# Patient Record
Sex: MALE | Race: WHITE | NOT HISPANIC OR LATINO
[De-identification: names, ages, dates, MRNs, and addresses within clinical notes are randomized per-mention and may not be internally consistent; named-entity substitution may affect disease eponyms.]

---

## 2017-03-14 ENCOUNTER — APPOINTMENT (OUTPATIENT)
Dept: CARDIOLOGY | Facility: CLINIC | Age: 52
End: 2017-03-14

## 2017-03-28 ENCOUNTER — APPOINTMENT (OUTPATIENT)
Dept: CARDIOLOGY | Facility: CLINIC | Age: 52
End: 2017-03-28

## 2017-08-08 ENCOUNTER — MEDICATION RENEWAL (OUTPATIENT)
Age: 52
End: 2017-08-08

## 2018-02-17 ENCOUNTER — RX RENEWAL (OUTPATIENT)
Age: 53
End: 2018-02-17

## 2019-05-15 ENCOUNTER — NON-APPOINTMENT (OUTPATIENT)
Age: 54
End: 2019-05-15

## 2019-05-15 ENCOUNTER — APPOINTMENT (OUTPATIENT)
Dept: CARDIOLOGY | Facility: CLINIC | Age: 54
End: 2019-05-15
Payer: COMMERCIAL

## 2019-05-15 VITALS — HEART RATE: 68 BPM | DIASTOLIC BLOOD PRESSURE: 102 MMHG | OXYGEN SATURATION: 100 % | SYSTOLIC BLOOD PRESSURE: 158 MMHG

## 2019-05-15 VITALS — BODY MASS INDEX: 29.83 KG/M2 | WEIGHT: 202 LBS

## 2019-05-15 DIAGNOSIS — E78.5 HYPERLIPIDEMIA, UNSPECIFIED: ICD-10-CM

## 2019-05-15 DIAGNOSIS — Z82.49 FAMILY HISTORY OF ISCHEMIC HEART DISEASE AND OTHER DISEASES OF THE CIRCULATORY SYSTEM: ICD-10-CM

## 2019-05-15 DIAGNOSIS — I25.10 ATHEROSCLEROTIC HEART DISEASE OF NATIVE CORONARY ARTERY W/OUT ANGINA PECTORIS: ICD-10-CM

## 2019-05-15 DIAGNOSIS — K21.9 GASTRO-ESOPHAGEAL REFLUX DISEASE W/OUT ESOPHAGITIS: ICD-10-CM

## 2019-05-15 DIAGNOSIS — Z60.2 PROBLEMS RELATED TO LIVING ALONE: ICD-10-CM

## 2019-05-15 DIAGNOSIS — Z78.9 OTHER SPECIFIED HEALTH STATUS: ICD-10-CM

## 2019-05-15 PROCEDURE — 93000 ELECTROCARDIOGRAM COMPLETE: CPT

## 2019-05-15 PROCEDURE — 99214 OFFICE O/P EST MOD 30 MIN: CPT

## 2019-05-15 RX ORDER — LISINOPRIL 20 MG/1
20 TABLET ORAL
Refills: 0 | Status: ACTIVE | COMMUNITY
Start: 2019-05-15

## 2019-05-15 SDOH — SOCIAL STABILITY - SOCIAL INSECURITY: PROBLEMS RELATED TO LIVING ALONE: Z60.2

## 2019-05-15 NOTE — HISTORY OF PRESENT ILLNESS
[FreeTextEntry1] : Patient is a 53-year-old  male with established coronary artery disease, status post CABG x4 on September 25, 2015 by Dr. Wild Robles, hyperlipidemia with intolerance to statins, patient is on Praluent. Patient is here for cardiology consultation since he has not seen a cardiologist in over 3 years.\par \par Patient denies any exertional complaints. All other systems reviewed negative.

## 2019-05-15 NOTE — ASSESSMENT
[FreeTextEntry1] : EKG- Sinus Bradycardia. NST\par \par Assessment:\par 1. CAD- S/P CABGX4\par 2. HTN\par 3. Hyperlipidemia- Intolerance to statins\par \par Recommendations:\par 1. Continue current medical therapy\par 2. Patient states he is scheduled to have echo with his PCP\par 3. Regular Stress Test\par 4. F/U 6 months

## 2019-05-15 NOTE — PHYSICAL EXAM
[General Appearance - Well Developed] : well developed [Normal Appearance] : normal appearance [Well Groomed] : well groomed [General Appearance - Well Nourished] : well nourished [No Deformities] : no deformities [General Appearance - In No Acute Distress] : no acute distress [Normal Conjunctiva] : the conjunctiva exhibited no abnormalities [Normal Oral Mucosa] : normal oral mucosa [Normal Oropharynx] : normal oropharynx [Heart Rate And Rhythm] : heart rate and rhythm were normal [FreeTextEntry1] : No JVD [Auscultation Breath Sounds / Voice Sounds] : lungs were clear to auscultation bilaterally [Bowel Sounds] : normal bowel sounds [Heart Sounds] : normal S1 and S2 [Abdomen Soft] : soft [Cyanosis, Localized] : no localized cyanosis [Abnormal Walk] : normal gait

## 2019-06-07 ENCOUNTER — APPOINTMENT (OUTPATIENT)
Dept: CARDIOLOGY | Facility: CLINIC | Age: 54
End: 2019-06-07

## 2019-09-16 ENCOUNTER — MEDICATION RENEWAL (OUTPATIENT)
Age: 54
End: 2019-09-16

## 2019-09-16 RX ORDER — ALIROCUMAB 150 MG/ML
150 INJECTION, SOLUTION SUBCUTANEOUS
Qty: 2 | Refills: 6 | Status: ACTIVE | COMMUNITY
Start: 2019-05-15 | End: 1900-01-01

## 2019-09-17 ENCOUNTER — MEDICATION RENEWAL (OUTPATIENT)
Age: 54
End: 2019-09-17

## 2020-05-20 ENCOUNTER — RX RENEWAL (OUTPATIENT)
Age: 55
End: 2020-05-20

## 2020-05-20 RX ORDER — ALIROCUMAB 150 MG/ML
150 INJECTION, SOLUTION SUBCUTANEOUS
Qty: 2 | Refills: 0 | Status: ACTIVE | COMMUNITY
Start: 2020-05-20 | End: 1900-01-01

## 2020-12-31 ENCOUNTER — EMERGENCY (EMERGENCY)
Facility: HOSPITAL | Age: 55
LOS: 1 days | Discharge: HOME | End: 2020-12-31
Attending: EMERGENCY MEDICINE
Payer: COMMERCIAL

## 2020-12-31 VITALS
HEIGHT: 69 IN | TEMPERATURE: 98 F | HEART RATE: 124 BPM | DIASTOLIC BLOOD PRESSURE: 124 MMHG | RESPIRATION RATE: 18 BRPM | OXYGEN SATURATION: 99 % | WEIGHT: 207.01 LBS | SYSTOLIC BLOOD PRESSURE: 208 MMHG

## 2020-12-31 VITALS
RESPIRATION RATE: 16 BRPM | HEART RATE: 95 BPM | DIASTOLIC BLOOD PRESSURE: 94 MMHG | TEMPERATURE: 98 F | SYSTOLIC BLOOD PRESSURE: 123 MMHG | OXYGEN SATURATION: 99 %

## 2020-12-31 LAB
ALBUMIN SERPL ELPH-MCNC: 4.4 G/DL — SIGNIFICANT CHANGE UP (ref 3.3–5.2)
ALP SERPL-CCNC: 51 U/L — SIGNIFICANT CHANGE UP (ref 40–120)
ALT FLD-CCNC: 17 U/L — SIGNIFICANT CHANGE UP
ANION GAP SERPL CALC-SCNC: 12 MMOL/L — SIGNIFICANT CHANGE UP (ref 5–17)
APPEARANCE UR: CLEAR — SIGNIFICANT CHANGE UP
APTT BLD: 30.7 SEC — SIGNIFICANT CHANGE UP (ref 27.5–35.5)
AST SERPL-CCNC: 31 U/L — SIGNIFICANT CHANGE UP
BASOPHILS # BLD AUTO: 0.06 K/UL — SIGNIFICANT CHANGE UP (ref 0–0.2)
BASOPHILS NFR BLD AUTO: 0.7 % — SIGNIFICANT CHANGE UP (ref 0–2)
BILIRUB SERPL-MCNC: 0.5 MG/DL — SIGNIFICANT CHANGE UP (ref 0.4–2)
BILIRUB UR-MCNC: NEGATIVE — SIGNIFICANT CHANGE UP
BUN SERPL-MCNC: 18 MG/DL — SIGNIFICANT CHANGE UP (ref 8–20)
CALCIUM SERPL-MCNC: 9.4 MG/DL — SIGNIFICANT CHANGE UP (ref 8.6–10.2)
CHLORIDE SERPL-SCNC: 104 MMOL/L — SIGNIFICANT CHANGE UP (ref 98–107)
CO2 SERPL-SCNC: 21 MMOL/L — LOW (ref 22–29)
COLOR SPEC: YELLOW — SIGNIFICANT CHANGE UP
CREAT SERPL-MCNC: 0.96 MG/DL — SIGNIFICANT CHANGE UP (ref 0.5–1.3)
DIFF PNL FLD: NEGATIVE — SIGNIFICANT CHANGE UP
EOSINOPHIL # BLD AUTO: 0.05 K/UL — SIGNIFICANT CHANGE UP (ref 0–0.5)
EOSINOPHIL NFR BLD AUTO: 0.6 % — SIGNIFICANT CHANGE UP (ref 0–6)
ETHANOL SERPL-MCNC: <10 MG/DL — HIGH (ref 0–9)
GLUCOSE SERPL-MCNC: 150 MG/DL — HIGH (ref 70–99)
GLUCOSE UR QL: NEGATIVE MG/DL — SIGNIFICANT CHANGE UP
HCT VFR BLD CALC: 36.8 % — LOW (ref 39–50)
HGB BLD-MCNC: 10.7 G/DL — LOW (ref 13–17)
IMM GRANULOCYTES NFR BLD AUTO: 0.5 % — SIGNIFICANT CHANGE UP (ref 0–1.5)
INR BLD: 1.14 RATIO — SIGNIFICANT CHANGE UP (ref 0.88–1.16)
KETONES UR-MCNC: NEGATIVE — SIGNIFICANT CHANGE UP
LEUKOCYTE ESTERASE UR-ACNC: NEGATIVE — SIGNIFICANT CHANGE UP
LIDOCAIN IGE QN: 32 U/L — SIGNIFICANT CHANGE UP (ref 22–51)
LYMPHOCYTES # BLD AUTO: 1.06 K/UL — SIGNIFICANT CHANGE UP (ref 1–3.3)
LYMPHOCYTES # BLD AUTO: 12.8 % — LOW (ref 13–44)
MAGNESIUM SERPL-MCNC: 2 MG/DL — SIGNIFICANT CHANGE UP (ref 1.6–2.6)
MCHC RBC-ENTMCNC: 22.5 PG — LOW (ref 27–34)
MCHC RBC-ENTMCNC: 29.1 GM/DL — LOW (ref 32–36)
MCV RBC AUTO: 77.3 FL — LOW (ref 80–100)
MONOCYTES # BLD AUTO: 0.44 K/UL — SIGNIFICANT CHANGE UP (ref 0–0.9)
MONOCYTES NFR BLD AUTO: 5.3 % — SIGNIFICANT CHANGE UP (ref 2–14)
NEUTROPHILS # BLD AUTO: 6.63 K/UL — SIGNIFICANT CHANGE UP (ref 1.8–7.4)
NEUTROPHILS NFR BLD AUTO: 80.1 % — HIGH (ref 43–77)
NITRITE UR-MCNC: NEGATIVE — SIGNIFICANT CHANGE UP
PH UR: 7 — SIGNIFICANT CHANGE UP (ref 5–8)
PLATELET # BLD AUTO: 285 K/UL — SIGNIFICANT CHANGE UP (ref 150–400)
POTASSIUM SERPL-MCNC: 4.1 MMOL/L — SIGNIFICANT CHANGE UP (ref 3.5–5.3)
POTASSIUM SERPL-SCNC: 4.1 MMOL/L — SIGNIFICANT CHANGE UP (ref 3.5–5.3)
PROT SERPL-MCNC: 7.6 G/DL — SIGNIFICANT CHANGE UP (ref 6.6–8.7)
PROT UR-MCNC: NEGATIVE MG/DL — SIGNIFICANT CHANGE UP
PROTHROM AB SERPL-ACNC: 13.1 SEC — SIGNIFICANT CHANGE UP (ref 10.6–13.6)
RBC # BLD: 4.76 M/UL — SIGNIFICANT CHANGE UP (ref 4.2–5.8)
RBC # FLD: 15.5 % — HIGH (ref 10.3–14.5)
SODIUM SERPL-SCNC: 137 MMOL/L — SIGNIFICANT CHANGE UP (ref 135–145)
SP GR SPEC: 1 — LOW (ref 1.01–1.02)
TROPONIN T SERPL-MCNC: <0.01 NG/ML — SIGNIFICANT CHANGE UP (ref 0–0.06)
TROPONIN T SERPL-MCNC: <0.01 NG/ML — SIGNIFICANT CHANGE UP (ref 0–0.06)
UROBILINOGEN FLD QL: NEGATIVE MG/DL — SIGNIFICANT CHANGE UP
WBC # BLD: 8.28 K/UL — SIGNIFICANT CHANGE UP (ref 3.8–10.5)
WBC # FLD AUTO: 8.28 K/UL — SIGNIFICANT CHANGE UP (ref 3.8–10.5)

## 2020-12-31 PROCEDURE — 81003 URINALYSIS AUTO W/O SCOPE: CPT

## 2020-12-31 PROCEDURE — 93005 ELECTROCARDIOGRAM TRACING: CPT

## 2020-12-31 PROCEDURE — 96375 TX/PRO/DX INJ NEW DRUG ADDON: CPT | Mod: XU

## 2020-12-31 PROCEDURE — 83735 ASSAY OF MAGNESIUM: CPT

## 2020-12-31 PROCEDURE — 96361 HYDRATE IV INFUSION ADD-ON: CPT

## 2020-12-31 PROCEDURE — 93010 ELECTROCARDIOGRAM REPORT: CPT

## 2020-12-31 PROCEDURE — 80307 DRUG TEST PRSMV CHEM ANLYZR: CPT

## 2020-12-31 PROCEDURE — 83690 ASSAY OF LIPASE: CPT

## 2020-12-31 PROCEDURE — 71046 X-RAY EXAM CHEST 2 VIEWS: CPT | Mod: 26

## 2020-12-31 PROCEDURE — 93307 TTE W/O DOPPLER COMPLETE: CPT

## 2020-12-31 PROCEDURE — 85025 COMPLETE CBC W/AUTO DIFF WBC: CPT

## 2020-12-31 PROCEDURE — 99284 EMERGENCY DEPT VISIT MOD MDM: CPT | Mod: 25

## 2020-12-31 PROCEDURE — 80053 COMPREHEN METABOLIC PANEL: CPT

## 2020-12-31 PROCEDURE — 36415 COLL VENOUS BLD VENIPUNCTURE: CPT

## 2020-12-31 PROCEDURE — 99218: CPT

## 2020-12-31 PROCEDURE — G0378: CPT

## 2020-12-31 PROCEDURE — 96374 THER/PROPH/DIAG INJ IV PUSH: CPT

## 2020-12-31 PROCEDURE — 85730 THROMBOPLASTIN TIME PARTIAL: CPT

## 2020-12-31 PROCEDURE — 85610 PROTHROMBIN TIME: CPT

## 2020-12-31 PROCEDURE — 93306 TTE W/DOPPLER COMPLETE: CPT | Mod: 26

## 2020-12-31 PROCEDURE — 84484 ASSAY OF TROPONIN QUANT: CPT

## 2020-12-31 PROCEDURE — 71046 X-RAY EXAM CHEST 2 VIEWS: CPT

## 2020-12-31 RX ORDER — VALSARTAN 80 MG/1
160 TABLET ORAL DAILY
Refills: 0 | Status: DISCONTINUED | OUTPATIENT
Start: 2020-12-31 | End: 2021-01-04

## 2020-12-31 RX ORDER — SODIUM CHLORIDE 9 MG/ML
1000 INJECTION INTRAMUSCULAR; INTRAVENOUS; SUBCUTANEOUS ONCE
Refills: 0 | Status: COMPLETED | OUTPATIENT
Start: 2020-12-31 | End: 2020-12-31

## 2020-12-31 RX ORDER — PANTOPRAZOLE SODIUM 20 MG/1
40 TABLET, DELAYED RELEASE ORAL ONCE
Refills: 0 | Status: COMPLETED | OUTPATIENT
Start: 2020-12-31 | End: 2020-12-31

## 2020-12-31 RX ORDER — DIAZEPAM 5 MG
10 TABLET ORAL ONCE
Refills: 0 | Status: DISCONTINUED | OUTPATIENT
Start: 2020-12-31 | End: 2020-12-31

## 2020-12-31 RX ORDER — ATORVASTATIN CALCIUM 80 MG/1
20 TABLET, FILM COATED ORAL AT BEDTIME
Refills: 0 | Status: DISCONTINUED | OUTPATIENT
Start: 2020-12-31 | End: 2021-01-04

## 2020-12-31 RX ORDER — METOPROLOL TARTRATE 50 MG
50 TABLET ORAL DAILY
Refills: 0 | Status: DISCONTINUED | OUTPATIENT
Start: 2020-12-31 | End: 2021-01-04

## 2020-12-31 RX ORDER — ASPIRIN/CALCIUM CARB/MAGNESIUM 324 MG
162 TABLET ORAL ONCE
Refills: 0 | Status: COMPLETED | OUTPATIENT
Start: 2020-12-31 | End: 2020-12-31

## 2020-12-31 RX ADMIN — Medication 50 MILLIGRAM(S): at 16:47

## 2020-12-31 RX ADMIN — PANTOPRAZOLE SODIUM 40 MILLIGRAM(S): 20 TABLET, DELAYED RELEASE ORAL at 12:34

## 2020-12-31 RX ADMIN — SODIUM CHLORIDE 1000 MILLILITER(S): 9 INJECTION INTRAMUSCULAR; INTRAVENOUS; SUBCUTANEOUS at 15:42

## 2020-12-31 RX ADMIN — Medication 162 MILLIGRAM(S): at 12:34

## 2020-12-31 RX ADMIN — Medication 10 MILLIGRAM(S): at 12:34

## 2020-12-31 RX ADMIN — SODIUM CHLORIDE 1000 MILLILITER(S): 9 INJECTION INTRAMUSCULAR; INTRAVENOUS; SUBCUTANEOUS at 12:34

## 2020-12-31 NOTE — ED ADULT NURSE NOTE - CHPI ED NUR SYMPTOMS NEG
no blood in stool/no burning urination/no chills/no diarrhea/no dysuria/no fever/no hematuria/no nausea/no vomiting

## 2020-12-31 NOTE — ED PROVIDER NOTE - NS ED ROS FT
ROS: +CP no SOB. no cough. no fever. no n/v/d/c. +abd pain. no rash. no bleeding. no urinary complaints. no weakness. no vision changes. no HA. no neck/back pain. no extremity swelling/deformity. No change in mental status.

## 2020-12-31 NOTE — ED ADULT NURSE NOTE - OBJECTIVE STATEMENT
Pt with PMHx of CABG (4 years ago) presents with c/o upper abd pain with bloating. Pt states it worsens with eating, currently improved. Pt states last night the pain was intense and radiated bilaterally in his chest. Pt admits to drinking beer often and last drink was Sunday. Pt states he feels "a little shaky". No other signs of withdrawals. Pt saw PMD and had abnormal EKG, pt sent to ER for eval. Pt denies any N/V/D, fevers, chills, cough, sob.

## 2020-12-31 NOTE — ED CDU PROVIDER DISPOSITION NOTE - ATTENDING CONTRIBUTION TO CARE
I, Nirmal Luna, have personally performed a face to face diagnostic evaluation on this patient. I have reviewed the ACP note and agree with the history, exam and plan of care, except as noted.    56 yo M hx HTN, hyperlipidemia and CAD s/p 4 vessel CABG in 2015 p/w chest pain and abdominal pain x 4 days. ekg without ischemic changes. trop negative x 2. TTE unremarkable. patient seen by cardiology, cleared for discharge with follow up stress test outpatient.

## 2020-12-31 NOTE — ED ADULT TRIAGE NOTE - CHIEF COMPLAINT QUOTE
Patient states that he was having epigastric pain last night that became to move into his chest this morning. Patient states that he went to his PCP and had EKG changes. PMHx of bypass surgery. Patient denies any SOB.

## 2020-12-31 NOTE — ED ADULT NURSE REASSESSMENT NOTE - NS ED NURSE REASSESS COMMENT FT1
Assumed care of the patient at 1600. Verbal report received from [facility]. Patient currently in TTE testing. Patient to be transferred to CDU 9 after Echo testing.
Patient offered librium po, refuses at this time.
Patient transferred back from TTE testing to CDU 9. Patient A&Ox4. Asymptomatic. Denies CP/SOB/palpitations or dizziness at this time. VSS. NSR on CM. PIV patent. Ambulatory with steady gait. Rpt trop sent. Pending results from blood work and TTE read. CIWA 1, will give librium po 25mg as per orders. Patient admits to drink 5 beers everyday. Patient in understanding of plan of care. Patient with no further questions for the RN. Resting in comfort. Call bell within reach and encouraged to use when assistance needed. Will continue to monitor.

## 2020-12-31 NOTE — ED CDU PROVIDER DISPOSITION NOTE - PATIENT PORTAL LINK FT
You can access the FollowMyHealth Patient Portal offered by [facility] by registering at the following website: [email or web address]. By joining [facility]’s FollowMyHealth portal, you will also be able to view your health information using other applications (apps) compatible with our system.

## 2020-12-31 NOTE — ED PROVIDER NOTE - CLINICAL SUMMARY MEDICAL DECISION MAKING FREE TEXT BOX
patient with upper abd pain radiating into chest, mild component of alcohol withdrawal, concern for mild pancreatitis vs PUD/gastritis vs ACS. with ekg changes will require further cardiac w/u

## 2020-12-31 NOTE — CONSULT NOTE ADULT - SUBJECTIVE AND OBJECTIVE BOX
Patient is a 55y old  Male who presents with a chief complaint of abdominal pain and lower chest pain    HPI:  55 year old male with a history of HTN, hyperlipidemia and CAD s/p 4 vessel CABG in  who had abdominal pain .  It improved.  Last night he had recurrence and felt it rise up into the chest and especially in the epigastric area.  There was no associated nausea, vomiting, diaphoresis or shortness of breath.  This is completely different than his anginal pain at the time of his CABG which included arm pain and dyspnea on minimal exertion.  He has not taken his Metoprolol for a couple of days due to abdominal discomfort.  He went to see his PMD who recommended he come to the ER to possibly check for pancreatitis as he does have a history of drinking and has been trying to cut down.  Currently patient is feeling well and is back to baseline and feels well with no chest or abdominal discomfort.    PAST MEDICAL & SURGICAL HISTORY:  HTN, Hyperlipidemia, CAD s/p 4 vessel CABG      Allergies    No Known Allergies    Intolerances: Statins.  Patient maintained on Praluent        MEDICATIONS  (STANDING):  Praluent  metoprolol tartrate 50 milliGRAM(s) Oral BID  valsartan 160 milliGRAM(s) Oral daily  ASA 81  Protonix    FAMILY HISTORY:      SOCIAL HISTORY:    CIGARETTES:  Former. Does still smoke Marijuana    ALCOHOL:  6-7 beers a day    REVIEW OF SYSTEMS:  CONSTITUTIONAL: No fever, weight loss, or fatigue  EYES: No eye pain, visual disturbances, or discharge  ENMT:  No difficulty hearing, tinnitus, vertigo; No sinus or throat pain  NECK: No pain or stiffness  RESPIRATORY: No cough, wheezing, chills or hemoptysis; No Shortness of Breath  CARDIOVASCULAR: No  palpitations, passing out, dizziness, or leg swelling  GASTROINTESTINAL: No abdominal or epigastric pain. No nausea, vomiting, or hematemesis; No diarrhea or constipation. No melena or hematochezia.  GENITOURINARY: No dysuria, frequency, hematuria, or incontinence  NEUROLOGICAL: No headaches, memory loss, loss of strength, numbness, or tremors  SKIN: No itching, burning, rashes, or lesions   LYMPH Nodes: No enlarged glands  ENDOCRINE: No heat or cold intolerance; No hair loss  MUSCULOSKELETAL: No joint pain or swelling; No muscle, back, or extremity pain  PSYCHIATRIC: No depression, anxiety, mood swings, or difficulty sleeping  HEME/LYMPH: No easy bruising, or bleeding gums  ALLERY AND IMMUNOLOGIC: No hives or eczema	    Vital Signs Last 24 Hrs  T(C): 36.7 (31 Dec 2020 16:28), Max: 36.7 (31 Dec 2020 11:22)  T(F): 98.1 (31 Dec 2020 16:28), Max: 98.1 (31 Dec 2020 16:28)  HR: 95 (31 Dec 2020 16:28) (93 - 124)  BP: 123/94 (31 Dec 2020 16:28) (123/94 - 208/124)  BP(mean): --  RR: 16 (31 Dec 2020 16:28) (16 - 18)  SpO2: 99% (31 Dec 2020 16:28) (99% - 100%)    Daily Height in cm: 175.26 (31 Dec 2020 11:22)    Daily     I&O's Detail      PHYSICAL EXAM:  Appearance: Normal, well nourished	  HEENT:   Normal oral mucosa, PERRL, EOMI, sclera non-icteric	  Lymphatic: No cervical lymphadenopathy  Cardiovascular: Normal S1 S2, No JVD, 2/6 systolic cardiac murmurs, No carotid bruits, No peripheral edema  Respiratory: Lungs clear to auscultation	  Psychiatry: A & O x 3, Mood & affect appropriate  Gastrointestinal:  Soft, Non-tender, + BS, no bruits	  Skin: No rashes, No ecchymoses, No cyanosis  Neurologic: Grossly non-focal with full strength in all four extremities  Extremities: Normal range of motion, No clubbing, cyanosis or edema  Vascular: Peripheral pulses palpable 2+ bilaterally        ECG:  NSR.  Nonspecific ST-T abnormalities    LABS:                        10.7   8.28  )-----------( 285      ( 31 Dec 2020 12:19 )             36.8     12    137  |  104  |  18.0  ----------------------------<  150<H>  4.1   |  21.0<L>  |  0.96    Ca    9.4      31 Dec 2020 12:19  Mg     2.0         TPro  7.6  /  Alb  4.4  /  TBili  0.5  /  DBili  x   /  AST  31  /  ALT  17  /  AlkPhos  51      CARDIAC MARKERS ( 31 Dec 2020 12:19 )  x     / <0.01 ng/mL / x     / x     / x          PT/INR - ( 31 Dec 2020 12:19 )   PT: 13.1 sec;   INR: 1.14 ratio         PTT - ( 31 Dec 2020 12:19 )  PTT:30.7 sec  Urinalysis Basic - ( 31 Dec 2020 14:07 )    Color: Yellow / Appearance: Clear / S.005 / pH: x  Gluc: x / Ketone: Negative  / Bili: Negative / Urobili: Negative mg/dL   Blood: x / Protein: Negative mg/dL / Nitrite: Negative   Leuk Esterase: Negative / RBC: x / WBC x   Sq Epi: x / Non Sq Epi: x / Bacteria: x      I&O's Summary    BNP  RADIOLOGY & ADDITIONAL STUDIES:    Assessment:  55 year old male with a history of HTN, hyperlipidemia and CAD s/p 4 vessel CABG in  who had abdominal pain .  It improved.  Last night he had recurrence and felt it rise up into the chest and especially in the epigastric area.  There was no associated nausea, vomiting, diaphoresis or shortness of breath.  This is completely different than his anginal pain at the time of his CABG which included arm pain and dyspnea on minimal exertion.  He has not taken his Metoprolol for a couple of days due to abdominal discomfort.  He went to see his PMD who recommended he come to the ER to possibly check for pancreatitis as he does have a history of drinking and has been trying to cut down.  Currently patient is feeling well and is back to baseline and feels well with no chest or abdominal discomfort.    Plan:  Echo shows normal LV function with no wall motion abnormalities.  There is mild AS and mild AI  ECG with nonspecific findings  Symptoms are atypical  Feels well now  If second set of Troponin is negative, he may be discharged home with close outpatient follow up and outpatient nuclear stress testing early next week.  Case discussed with ER staff.  Patient is also instructed to take all his medications and compliance is emphasized.

## 2020-12-31 NOTE — ED CDU PROVIDER INITIAL DAY NOTE - ATTENDING CONTRIBUTION TO CARE
56 y/o M c/o chest pain which started last night.  Patient states that pain started out in his abdomen, and is now in his chest.  Patient drinks alcohol daily.  Patient denies chest pain at this time.  TTE , troponinsm

## 2020-12-31 NOTE — ED CDU PROVIDER INITIAL DAY NOTE - OBJECTIVE STATEMENT
54 y/o M c/o chest pain which started last night.  Patient states that pain started out in his abdomen, and is now in his chest.  Patient drinks alcohol daily.  Patient denies chest pain at this time.

## 2020-12-31 NOTE — ED PROVIDER NOTE - OBJECTIVE STATEMENT
56yo M with CABG 4 years ago, since sunday with upper abd pain with bloating was severe worse with eating, now improved, but last night intense and radiated into b/l chest. no nauesa/vomiting. no fever/chills. no dizziness. admits to 5 beers lightly and has not drank since sunday and feels shaky no other withdrawal sx. no diarrha. no rash. went to PCP who said EKG concerning and sent to ED    PMH- HTN   PMD - Dr. Jorge Mims - [place]

## 2020-12-31 NOTE — ED STATDOCS - OBJECTIVE STATEMENT
56 y/o male with h/o CAD , etoh comes in c/o chest pan and upper abdominal pain   referred for r/o pancreatitis vs ACS   h/o cardiac bypass   pt states he also feel like he is withdrawing

## 2020-12-31 NOTE — ED PROVIDER NOTE - PHYSICAL EXAMINATION
Gen: NAD, AOx3  Head: NCAT  HEENT: EOMI, oral mucosa moist, normal conjunctiva, neck supple  Lung: CTAB, no respiratory distress  CV: tachy regular, faint systolic murmur, Normal perfusion  Abd: soft, mild epigastric ttp +distension  MSK: No edema, no visible deformities  Neuro: No focal neurologic deficits, +tongue fasciculations   Skin: No rash   Psych: normal affect

## 2020-12-31 NOTE — ED PROVIDER NOTE - CARE PLAN
Principal Discharge DX:	Chest pain at rest  Secondary Diagnosis:	Alcohol withdrawal syndrome without complication

## 2020-12-31 NOTE — ED CDU PROVIDER DISPOSITION NOTE - CARE PROVIDER_API CALL
Camron Valera)  Cardiovascular Disease; Interventional Cardiology  [address] [place] [address]  Phone: [phone]  Fax: [phone]  Follow Up Time:

## 2020-12-31 NOTE — ED CDU PROVIDER INITIAL DAY NOTE - MEDICAL DECISION MAKING DETAILS
chest pain - TTE, trend troponins, SS cardiology consult chest pain - TTE, trend troponins, cardiology consult (Dr. Valera)

## 2021-02-05 DIAGNOSIS — Z01.818 ENCOUNTER FOR OTHER PREPROCEDURAL EXAMINATION: ICD-10-CM

## 2021-02-06 ENCOUNTER — APPOINTMENT (OUTPATIENT)
Dept: DISASTER EMERGENCY | Facility: CLINIC | Age: 56
End: 2021-02-06

## 2021-02-07 LAB — SARS-COV-2 N GENE NPH QL NAA+PROBE: NOT DETECTED

## 2021-02-08 RX ORDER — CHLORHEXIDINE GLUCONATE 213 G/1000ML
1 SOLUTION TOPICAL ONCE
Refills: 0 | Status: DISCONTINUED | OUTPATIENT
Start: 2021-02-09 | End: 2021-02-23

## 2021-02-08 NOTE — H&P PST ADULT - ASSESSMENT
This is a 55 yo male with a PMHx of HTN & HLD and he is intolerant of statins.  He has had a 4VCABG in 2015.  On follow up with Dr Valera Mr Randy was sent for a NST.  Stress test resulted in ST changes in the inferolateral leads 1.5mm dounslopin ST depression and the perfusion scan was negative for exercise induced myocardial ischemia.    Abnormal NST  Plan [facility]     Plan: PRE-PROCEDURE ASSESSMENT    -Patient seen and examined  -Labs reviewed  -Pre-procedure teaching completed with patient   -Questions answered about patients concerns     -instructed to NPO after midnight.   - Pt instructed to have escort to and from procedure   -pt instructed IF STENT PLACED WILL REQUIRE TO STAY OVERNIGHT FOR MONITORING AND DISCHARGED IN THE AM .   - BC GFR is  and has had a history of decompensated CHF a BOLUS OF NS at 1ml/KG/hr will be initiated prior to the cath   - and advised to have GFR repeated 2-5 days post Cardiac Cath

## 2021-02-08 NOTE — H&P PST ADULT - HISTORY OF PRESENT ILLNESS
Narrative: This is a 53 yo male with a PMHx of HTN & HLD and he is intolerant of statins.  He has had a 4VCABG in .  On follow up with Dr Valera Mr Padilla was sent for a NST.  Stress test resulted in ST changes in the inferolateral leads 1.5mm dounslopin ST depression and the perfusion scan was negative for exercise induced myocardial ischemia.    ASA  Mallampati  GFR  Creat  Bleeding Risk  COVID19 -  Negative 2021    Symptoms: Denied       Angina (Class):        Ischemic Symptoms:     Heart Failure: No       Systolic/Diastolic/Combined:        NYHA Class (within 2 weeks):     Assessment of LVEF:       EF: 67%       Assessed by: NST        Date: 2021    Prior Cardiac Interventions:       PCI's: None       CABVCABG in   Coronary Artery Bypass x4.  Left internal mammary artery to LAD artery, Right internal Mammary artery to the PDA, Reverse saphenous venin graft to the first obtuse marginal artery, and reverse saphenous vein graft to the OND Obtuse Marginal Artery.      Noninvasive Testing:   Stress Test: Date: 2021       Protocol: Exercise Sestamibi Stress Test       Duration of Exercise: 6.0 minutes       Symptoms: Dizziness dyspnea and fatigue       EKG Changes: 1.5mm dounsloping ST depression in the inferolateral leads       DTS:        Myocardial Imaging: Normal myocardial perfusion imaging with no evidence of exercise-induced myocardial ischemia or infarction.  EF 67%.       Risk Assessment:     Echo:   2020  EF 65%  Mild LA enlargement  Moderate aortic valve regurgitation  Mild Mitral Valve inflow, pulmonary vein dpppler and tissue doppler suggests grade I diastolic dysfunction without elevated LA pressure     Antianginal Therapies:        Beta Blockers:  Metoprolol       Calcium Channel Blockers:        Long Acting Nitrates:        Ranexa:     Associated Risk Factors:        Cerebrovascular Disease: N/A       Chronic Lung Disease: N/A       Peripheral Arterial Disease: N/A       Chronic Kidney Disease (if yes, what is GFR): N/A       Uncontrolled Diabetes (if yes, what is HgbA1C or FBS): N/A       Poorly Controlled Hypertension (if yes, what is SBP): N/A       Morbid Obesity (if yes, what is BMI): N/A       History of Recent Ventricular Arrhythmia: N/A       Inability to Ambulate Safely: N/A       Need for Therapeutic Anticoagulation: N/A       Antiplatelet or Contrast Allergy: N/A Narrative: This is a 55 yo male with a PMHx of HTN & HLD and he is intolerant of statins.  He has had a 4VCABG in .  On follow up with Dr Valera Mr Padilla was sent for a NST.  Stress test resulted in ST changes in the inferolateral leads 1.5mm dounslopin ST depression and the perfusion scan was negative for exercise induced myocardial ischemia. He was referred for Cardiac cath due to intermittent SOB.   He was a heavy beer drinker till 2 months ago . He reports limiting his intake . He  prescribes to smoking pot daily since  15 years old and taking aleve for chronic shoulder pain daily . He presents today anxious and hypertensive   His EKG NSR Rate 89 no acute ST or Twave changes     ASA2  Mallampati2  GFR  Creat  Bleeding Risk  COVID19 -  Negative 2021    Symptoms: Denied       Angina (Class):        Ischemic Symptoms:     Heart Failure: No       Systolic/Diastolic/Combined:        NYHA Class (within 2 weeks):     Assessment of LVEF:       EF: 67%       Assessed by: NST        Date: 2021    Prior Cardiac Interventions:       PCI's: None       CABVCABG in 2015  Coronary Artery Bypass x4.  Left internal mammary artery to LAD artery, Right internal Mammary artery to the PDA, Reverse saphenous venin graft to the first obtuse marginal artery, and reverse saphenous vein graft to the OND Obtuse Marginal Artery.      Noninvasive Testing:   Stress Test: Date: 2021       Protocol: Exercise Sestamibi Stress Test       Duration of Exercise: 6.0 minutes       Symptoms: Dizziness dyspnea and fatigue       EKG Changes: 1.5mm downsloping ST depression in the inferolateral leads       DTS:        Myocardial Imaging: Normal myocardial perfusion imaging with no evidence of exercise-induced myocardial ischemia or infarction.  EF 67%.       Risk Assessment:     Echo:   2020  EF 65%  Mild LA enlargement  Moderate aortic valve regurgitation  Mild Mitral Valve inflow, pulmonary vein doppler and tissue doppler suggests grade I diastolic dysfunction without elevated LA pressure     Antianginal Therapies:        Beta Blockers:  Metoprolol       Calcium Channel Blockers:        Long Acting Nitrates:        Ranexa:     Associated Risk Factors:        Cerebrovascular Disease: N/A       Chronic Lung Disease: N/A       Peripheral Arterial Disease: N/A       Chronic Kidney Disease (if yes, what is GFR): N/A       Uncontrolled Diabetes (if yes, what is HgbA1C or FBS): N/A       Poorly Controlled Hypertension (if yes, what is SBP): N/A       Morbid Obesity (if yes, what is BMI): N/A       History of Recent Ventricular Arrhythmia: N/A       Inability to Ambulate Safely: N/A       Need for Therapeutic Anticoagulation: N/A       Antiplatelet or Contrast Allergy: N/A

## 2021-02-09 ENCOUNTER — TRANSCRIPTION ENCOUNTER (OUTPATIENT)
Age: 56
End: 2021-02-09

## 2021-02-09 ENCOUNTER — OUTPATIENT (OUTPATIENT)
Dept: OUTPATIENT SERVICES | Facility: HOSPITAL | Age: 56
LOS: 1 days | Discharge: HOME | End: 2021-02-09
Payer: COMMERCIAL

## 2021-02-09 VITALS
DIASTOLIC BLOOD PRESSURE: 62 MMHG | OXYGEN SATURATION: 98 % | HEART RATE: 82 BPM | SYSTOLIC BLOOD PRESSURE: 130 MMHG | RESPIRATION RATE: 16 BRPM

## 2021-02-09 VITALS
SYSTOLIC BLOOD PRESSURE: 152 MMHG | DIASTOLIC BLOOD PRESSURE: 83 MMHG | OXYGEN SATURATION: 96 % | RESPIRATION RATE: 18 BRPM | HEART RATE: 87 BPM

## 2021-02-09 DIAGNOSIS — R94.39 ABNORMAL RESULT OF OTHER CARDIOVASCULAR FUNCTION STUDY: ICD-10-CM

## 2021-02-09 DIAGNOSIS — Z95.1 PRESENCE OF AORTOCORONARY BYPASS GRAFT: Chronic | ICD-10-CM

## 2021-02-09 LAB
ALBUMIN SERPL ELPH-MCNC: 4.6 G/DL — SIGNIFICANT CHANGE UP (ref 3.3–5.2)
ALP SERPL-CCNC: 58 U/L — SIGNIFICANT CHANGE UP (ref 40–120)
ALT FLD-CCNC: 15 U/L — SIGNIFICANT CHANGE UP
ANION GAP SERPL CALC-SCNC: 16 MMOL/L — SIGNIFICANT CHANGE UP (ref 5–17)
APTT BLD: 31.1 SEC — SIGNIFICANT CHANGE UP (ref 27.5–35.5)
AST SERPL-CCNC: 27 U/L — SIGNIFICANT CHANGE UP
BASOPHILS # BLD AUTO: 0.06 K/UL — SIGNIFICANT CHANGE UP (ref 0–0.2)
BASOPHILS NFR BLD AUTO: 0.8 % — SIGNIFICANT CHANGE UP (ref 0–2)
BILIRUB SERPL-MCNC: 0.5 MG/DL — SIGNIFICANT CHANGE UP (ref 0.4–2)
BUN SERPL-MCNC: 20 MG/DL — SIGNIFICANT CHANGE UP (ref 8–20)
CALCIUM SERPL-MCNC: 9.4 MG/DL — SIGNIFICANT CHANGE UP (ref 8.6–10.2)
CHLORIDE SERPL-SCNC: 101 MMOL/L — SIGNIFICANT CHANGE UP (ref 98–107)
CO2 SERPL-SCNC: 22 MMOL/L — SIGNIFICANT CHANGE UP (ref 22–29)
CREAT SERPL-MCNC: 1.1 MG/DL — SIGNIFICANT CHANGE UP (ref 0.5–1.3)
EOSINOPHIL # BLD AUTO: 0.09 K/UL — SIGNIFICANT CHANGE UP (ref 0–0.5)
EOSINOPHIL NFR BLD AUTO: 1.2 % — SIGNIFICANT CHANGE UP (ref 0–6)
GLUCOSE SERPL-MCNC: 152 MG/DL — HIGH (ref 70–99)
HCT VFR BLD CALC: 38 % — LOW (ref 39–50)
HGB BLD-MCNC: 11.4 G/DL — LOW (ref 13–17)
IMM GRANULOCYTES NFR BLD AUTO: 0.4 % — SIGNIFICANT CHANGE UP (ref 0–1.5)
INR BLD: 1.05 RATIO — SIGNIFICANT CHANGE UP (ref 0.88–1.16)
LYMPHOCYTES # BLD AUTO: 1.61 K/UL — SIGNIFICANT CHANGE UP (ref 1–3.3)
LYMPHOCYTES # BLD AUTO: 21.8 % — SIGNIFICANT CHANGE UP (ref 13–44)
MCHC RBC-ENTMCNC: 22.7 PG — LOW (ref 27–34)
MCHC RBC-ENTMCNC: 30 GM/DL — LOW (ref 32–36)
MCV RBC AUTO: 75.7 FL — LOW (ref 80–100)
MONOCYTES # BLD AUTO: 0.46 K/UL — SIGNIFICANT CHANGE UP (ref 0–0.9)
MONOCYTES NFR BLD AUTO: 6.2 % — SIGNIFICANT CHANGE UP (ref 2–14)
NEUTROPHILS # BLD AUTO: 5.13 K/UL — SIGNIFICANT CHANGE UP (ref 1.8–7.4)
NEUTROPHILS NFR BLD AUTO: 69.6 % — SIGNIFICANT CHANGE UP (ref 43–77)
PLATELET # BLD AUTO: 299 K/UL — SIGNIFICANT CHANGE UP (ref 150–400)
POTASSIUM SERPL-MCNC: 4 MMOL/L — SIGNIFICANT CHANGE UP (ref 3.5–5.3)
POTASSIUM SERPL-SCNC: 4 MMOL/L — SIGNIFICANT CHANGE UP (ref 3.5–5.3)
PROT SERPL-MCNC: 7.5 G/DL — SIGNIFICANT CHANGE UP (ref 6.6–8.7)
PROTHROM AB SERPL-ACNC: 12.1 SEC — SIGNIFICANT CHANGE UP (ref 10.6–13.6)
RBC # BLD: 5.02 M/UL — SIGNIFICANT CHANGE UP (ref 4.2–5.8)
RBC # FLD: 17.3 % — HIGH (ref 10.3–14.5)
SODIUM SERPL-SCNC: 139 MMOL/L — SIGNIFICANT CHANGE UP (ref 135–145)
WBC # BLD: 7.38 K/UL — SIGNIFICANT CHANGE UP (ref 3.8–10.5)
WBC # FLD AUTO: 7.38 K/UL — SIGNIFICANT CHANGE UP (ref 3.8–10.5)

## 2021-02-09 PROCEDURE — 93459 L HRT ART/GRFT ANGIO: CPT

## 2021-02-09 PROCEDURE — 99152 MOD SED SAME PHYS/QHP 5/>YRS: CPT

## 2021-02-09 PROCEDURE — C1760: CPT

## 2021-02-09 PROCEDURE — 99153 MOD SED SAME PHYS/QHP EA: CPT

## 2021-02-09 PROCEDURE — 36415 COLL VENOUS BLD VENIPUNCTURE: CPT

## 2021-02-09 PROCEDURE — 85730 THROMBOPLASTIN TIME PARTIAL: CPT

## 2021-02-09 PROCEDURE — C1769: CPT

## 2021-02-09 PROCEDURE — 80053 COMPREHEN METABOLIC PANEL: CPT

## 2021-02-09 PROCEDURE — 85610 PROTHROMBIN TIME: CPT

## 2021-02-09 PROCEDURE — 93005 ELECTROCARDIOGRAM TRACING: CPT

## 2021-02-09 PROCEDURE — C1887: CPT

## 2021-02-09 PROCEDURE — 93010 ELECTROCARDIOGRAM REPORT: CPT

## 2021-02-09 PROCEDURE — 85025 COMPLETE CBC W/AUTO DIFF WBC: CPT

## 2021-02-09 PROCEDURE — C1894: CPT

## 2021-02-09 RX ORDER — PANTOPRAZOLE SODIUM 20 MG/1
1 TABLET, DELAYED RELEASE ORAL
Qty: 0 | Refills: 0 | DISCHARGE

## 2021-02-09 RX ORDER — VALSARTAN 80 MG/1
1 TABLET ORAL
Qty: 0 | Refills: 0 | DISCHARGE

## 2021-02-09 RX ORDER — ALIROCUMAB 75 MG/ML
0 INJECTION, SOLUTION SUBCUTANEOUS
Qty: 0 | Refills: 0 | DISCHARGE

## 2021-02-09 NOTE — DISCHARGE NOTE PROVIDER - NSDCMRMEDTOKEN_GEN_ALL_CORE_FT
aspirin 325 mg oral tablet: 1 tab(s) orally once a day  metoprolol tartrate 50 mg oral tablet: 1 tab(s) orally 2 times a day  pantoprazole 40 mg oral delayed release tablet: 1 tab(s) orally once a day  Praluent Pen 150 mg/mL subcutaneous solution: subcutaneous 2 times a month  valsartan 320 mg oral tablet: 1 tab(s) orally once a day

## 2021-02-09 NOTE — DISCHARGE NOTE PROVIDER - CARE PROVIDER_API CALL
Camron Valera)  Cardiovascular Disease; Interventional Cardiology  [address] [place] [address]  Phone: [phone]  Fax: [phone]  Follow Up Time:

## 2021-02-09 NOTE — DISCHARGE NOTE PROVIDER - NSDCFUADDINST_GEN_ALL_CORE_FT
- Bruising at the groin, sometimes extending down the leg, and/or a small lump under the skin at the groin access site is normal and will resolve within 2 – 3 weeks.   - Occasional skipped beats or palpitations that last for a few beats are common and generally resolve within 1-2 months.   - You may walk and take stairs at a regular pace.   - Do not perform any exercise more strenuous than walking for 1 week.   - Do not strain or lift heavy objects for 1 week.  - You may shower the day after the procedure.  - Do not soak in water (such as tub baths, hot tubs, swimming, etc.) for 1 week.   - You may resume all other activities the day after the procedure.  Call your doctor if:   - you notice bleeding, redness, drainage, swelling, increased tenderness or a hot sensation around the catheter insertion site.   - your temperature is greater than 100 degrees F for more than 24 hours.  - your rapid heart rhythm returns.  - you have any questions or concerns regarding the procedure.  If significant bleeding and/or a large lump (the size of a golf ball or bigger) occurs:  - Lie flat and apply continuous direct pressure just above the puncture site for at least 10 minutes  - If the issue resolves, notify your physician immediately.    - If the bleeding cannot be controlled, please seek immediate medical attention.  If you experience increased difficulty breathing or chest pain, or if you faint or have dizzy spells, please seek immediate medical attention.

## 2021-02-09 NOTE — DISCHARGE NOTE PROVIDER - NSDCCPCAREPLAN_GEN_ALL_CORE_FT
PRINCIPAL DISCHARGE DIAGNOSIS  Diagnosis: 3-vessel CAD  Assessment and Plan of Treatment: patent Coronary By pass grafts

## 2021-02-09 NOTE — DISCHARGE NOTE PROVIDER - HOSPITAL COURSE
55 yo male with a PMHx of HTN & HLD and he is intolerant of statins.  He has had a 4VCABG in 2015.  On follow up with Dr Valera Mr Padilla was sent for a NST.  Stress test resulted in ST changes in the inferolateral leads 1.5mm downsloping ST depression and the perfusion scan was negative for exercise induced myocardial ischemia. He was referred for Cardiac cath due to intermittent SOB.   He was a heavy beer drinker till 2 months ago . He reports limiting his intake . He  prescribes to smoking pot daily since  15 years old and taking aleve for chronic shoulder pain daily . He presents today anxious and hypertensive   His EKG NSR Rate 89 no acute ST or Twave changes now s/p LHC revealing patent By pass grafts       - Post orders and observations   -vital signs, labs, diet and activity per post procedure orders  ambulate ad partha post bedrest  -iv hydration  -encourage PO fluids  - Groin precautions   continue medical therapy   -plan of care discussed with patient, family and MD   -post procedure and d/c instructions reviewed  -follow up with MD in 1-2 weeks Dr Valera   -Discussed therapeutic lifestyle changes to reduce risk factors such as following a cardiac diet, weight loss, maintaining a healthy weight, exercise, smoking cessation, medication compliance, and regular follow-up  with MD to know your numbers (BP, cholesterol, weight, and glucose)

## 2021-02-09 NOTE — PROGRESS NOTE ADULT - SUBJECTIVE AND OBJECTIVE BOX
s/p LHC revealing patent grafts done via RFA tolerated well       Patient feels well.  Denies chest pain, shortness of breath, dizziness or palpitations at this time    Right groin procedure site CDI.  no bleeding, no hematoma, site soft, non tender, positive pedal pulses bilaterally    HPI:  Narrative: This is a 55 yo male with a PMHx of HTN & HLD and he is intolerant of statins.  He has had a 4VCABG in .  On follow up with Dr Valera Mr Padilla was sent for a NST.  Stress test resulted in ST changes in the inferolateral leads 1.5mm dounslopin ST depression and the perfusion scan was negative for exercise induced myocardial ischemia. He was referred for Cardiac cath due to intermittent SOB.   He was a heavy beer drinker till 2 months ago . He reports limiting his intake . He  prescribes to smoking pot daily since  15 years old and taking aleve for chronic shoulder pain daily . He presents today anxious and hypertensive   His EKG NSR Rate 89 no acute ST or Twave changes     ASA2  Mallampati2  GFR  Creat  Bleeding Risk  COVID19 -  Negative 2021    Symptoms: Denied       Angina (Class):        Ischemic Symptoms:     Heart Failure: No       Systolic/Diastolic/Combined:        NYHA Class (within 2 weeks):     Assessment of LVEF:       EF: 67%       Assessed by: NST        Date: 2021    Prior Cardiac Interventions:       PCI's: None       CABVCABG in   Coronary Artery Bypass x4.  Left internal mammary artery to LAD artery, Right internal Mammary artery to the PDA, Reverse saphenous venin graft to the first obtuse marginal artery, and reverse saphenous vein graft to the OND Obtuse Marginal Artery.      Noninvasive Testing:   Stress Test: Date: 2021       Protocol: Exercise Sestamibi Stress Test       Duration of Exercise: 6.0 minutes       Symptoms: Dizziness dyspnea and fatigue       EKG Changes: 1.5mm downsloping ST depression in the inferolateral leads       DTS:        Myocardial Imaging: Normal myocardial perfusion imaging with no evidence of exercise-induced myocardial ischemia or infarction.  EF 67%.       Risk Assessment:     Echo:   2020  EF 65%  Mild LA enlargement  Moderate aortic valve regurgitation  Mild Mitral Valve inflow, pulmonary vein doppler and tissue doppler suggests grade I diastolic dysfunction without elevated LA pressure     Antianginal Therapies:        Beta Blockers:  Metoprolol       Calcium Channel Blockers:        Long Acting Nitrates:        Ranexa:     Associated Risk Factors:        Cerebrovascular Disease: N/A       Chronic Lung Disease: N/A       Peripheral Arterial Disease: N/A       Chronic Kidney Disease (if yes, what is GFR): N/A       Uncontrolled Diabetes (if yes, what is HgbA1C or FBS): N/A       Poorly Controlled Hypertension (if yes, what is SBP): N/A       Morbid Obesity (if yes, what is BMI): N/A       History of Recent Ventricular Arrhythmia: N/A       Inability to Ambulate Safely: N/A       Need for Therapeutic Anticoagulation: N/A       Antiplatelet or Contrast Allergy: N/A (2021 12:57)    now s/p LHC revealing patent By pass grafts       - Post orders and observations   -vital signs, labs, diet and activity per post procedure orders  ambulate ad partha post bedrest  -iv hydration  -encourage PO fluids  - Groin precautions   -plan of care discussed with patient, family and MD   -post procedure and d/c instructions reviewed  -follow up with MD in 1-2 weeks Dr Valera   -Discussed therapeutic lifestyle changes to reduce risk factors such as following a cardiac diet, weight loss, maintaining a healthy weight, exercise, smoking cessation, medication compliance, and regular follow-up  with MD to know your numbers (BP, cholesterol, weight, and glucose)

## 2021-02-09 NOTE — DISCHARGE NOTE NURSING/CASE MANAGEMENT/SOCIAL WORK - PATIENT PORTAL LINK FT
You can access the FollowMyHealth Patient Portal offered by [facility] by registering at the following website: [email or web address]. By joining [facility]’s FollowMyHealth portal, you will also be able to view your health information using other applications (apps) compatible with our system.

## 2021-02-11 DIAGNOSIS — I20.0 UNSTABLE ANGINA: ICD-10-CM

## 2021-05-26 ENCOUNTER — APPOINTMENT (OUTPATIENT)
Dept: DERMATOLOGY | Facility: CLINIC | Age: 56
End: 2021-05-26
Payer: COMMERCIAL

## 2021-05-26 PROCEDURE — 99072 ADDL SUPL MATRL&STAF TM PHE: CPT

## 2021-05-26 PROCEDURE — 99203 OFFICE O/P NEW LOW 30 MIN: CPT

## 2022-01-13 NOTE — ED ADULT NURSE REASSESSMENT NOTE - CAS EDN INTEG ASSESS
[facility] Orthopedic  [facility] Medicine  Progress Note    Patient Name: Karin Carrera  MRN: [id number]  Patient Class: IP- Inpatient   Admission Date: 1/1/2022  Length of Stay: 12 days  Attending Physician: Walker Anaya Jr., MD  Primary Care Provider: Walker Smith MD        Subjective:     Principal Problem:Closed fracture of right hip        HPI:  Mr Carrera is a 52 yo male who presented to the ED via EMS for c/o right leg pain after a fall. Per EMS and ED staff, he was picked up from his vehicle outside of a friend's home. He states that he lives in his vehicle. It was noted that there were numerous liquor bottles in the vehicle and around him. He was combative with EMS and has been uncooperative with much of his interview during his stay in the ED. He uses profanity easily but is aphasic during much of the ROS and interview. He will use his cell phone to type out some answers. He is a poor historian due to this and his obvious drunken state. He denies any chest pain, shortness of breath, abdominal pain, F/C, N/V/D, or changes in vision. He has right sided hemiparesis as a results of his previous CVA- however still drives. Spoke with his brother, Silver, who provided no new information- he has not seen his brother since the day before yesterday. He does state that his brother does not live with him because of the alcohol use and other private reasons. Upon workup, it was noted that he had a right femoral neck fracture. His ETOH level was 188. He is being admitted to hospitalist services with a consult to orthopaedics for surgical intervention.     He has a PMH of CABG, CVA, HTN, GERD, ETOH abuse, and nicotine abuse. It is unclear when his CABG or CVA was; unable to find dates in previous records. It is unknown if he has had covid or been vaccinated. He follows with Dr Smith for primary care. He does not follow with anyone for cardiology. Per patient, he wishes to be a full code.     Brother-  Silver- [phone]       Overview/Hospital Course:  01/02 - patient seems in less pain but obviously still sore.  He is calm and cooperative.  No new issues reported.  Seen preoperatively.  As previous addressed okay with me to proceed as planned; no medical reason to delay.  Watch for alcohol withdrawals postoperatively even though patient denies daily alcohol use.  Look into placement needs.  Dr. Goodman to assume care patient for hospitalist in the a.m..  1/3: NAEO; some pain at R hip; PT/OT to see; SS to assist with placement; continue on DT prevention- pt did initially state he drank daily and is now saying he drinks every other day  1/4: NAEO; no s/s of withdrawals; asking for snuff; PT following- having some issues with spasticity-- baclofen started; continue DT prevention; SS following for SWB   1/6: Echo EF 20%. Awaiting placement. Pt refuse NH.  1/7:Norvasc stopped.Lisinopril 2.5 mg.Lopressor 12.5 mg bid.Cardiologist appt new pt at [facility].  1/11: 1/11 po day #8. Continues to work with PT- pt agreeable to snf for and rehab.SS following    1/12: post op day #9; continue to work well with PT- SS with referrals to numerous places- awaiting placement  1/13: NAEO; post op day 10; continues to work with PT; shirlene to evaluate for placement         Interval History: Pt resting in bed. Denies any needs or complaints. Asking about car-- told him he needed to talk with his brother Silver. Working well with PT; pending placement. VSS, afebrile.     Review of Systems   Constitutional: Positive for activity change. Negative for appetite change, chills and fever.   Respiratory: Negative for cough, chest tightness and shortness of breath.    Cardiovascular: Negative for chest pain and palpitations.   Neurological: Negative for light-headedness.   All other systems reviewed and are negative.    Objective:     Vital Signs (Most Recent):  Temp: 96.8 °F (36 °C) (01/13/22 0720)  Pulse: 84 (01/13/22 0720)  Resp: 18 (01/13/22  0910)  BP: 131/74 (01/13/22 0720)  SpO2: 100 % (01/13/22 0720) Vital Signs (24h Range):  Temp:  [96.8 °F (36 °C)-98.7 °F (37.1 °C)] 96.8 °F (36 °C)  Pulse:  [] 84  Resp:  [16-19] 18  SpO2:  [96 %-100 %] 100 %  BP: (116-133)/(64-74) 131/74     Weight: 89.9 kg (198 lb 3.1 oz)  Body mass index is 26.15 kg/m².    Intake/Output Summary (Last 24 hours) at 1/13/2022 1030  Last data filed at 1/13/2022 0900  Gross per 24 hour   Intake --   Output 825 ml   Net -825 ml      Physical Exam  Vitals and nursing note reviewed.   Constitutional:       General: He is not in acute distress.  HENT:      Head: Normocephalic and atraumatic.      Mouth/Throat:      Mouth: Mucous membranes are moist.   Eyes:      Extraocular Movements: Extraocular movements intact.      Pupils: Pupils are equal, round, and reactive to light.   Cardiovascular:      Rate and Rhythm: Normal rate and regular rhythm.      Pulses: Normal pulses.   Pulmonary:      Effort: Pulmonary effort is normal. No respiratory distress.      Breath sounds: Normal breath sounds. No wheezing.   Abdominal:      General: Bowel sounds are normal. There is no distension.      Tenderness: There is no abdominal tenderness.   Musculoskeletal:         General: Tenderness present.      Cervical back: Normal range of motion and neck supple.        Legs:       Comments:  R hip with surgical dressing covering    Skin:     General: Skin is warm.      Capillary Refill: Capillary refill takes less than 2 seconds.   Neurological:      Mental Status: He is alert, oriented to person, place, and time and easily aroused. Mental status is at baseline.      GCS: GCS eye subscore is 4. GCS verbal subscore is 4. GCS motor subscore is 6.      Cranial Nerves: Cranial nerves are intact.      Sensory: Sensation is intact.      Motor: Weakness present.      Comments: Dense right-sided hemiparesis and expressive aphasia in this right-handed patient with old CVA   Psychiatric:         Mood and Affect:  Mood normal.         Significant Labs:   All pertinent labs within the past 24 hours have been reviewed.  Recent Lab Results     None          Significant Imaging: I have reviewed all pertinent imaging results/findings within the past 24 hours.      Assessment/Plan:      * Closed fracture of right hip  - acute  - R hip xr with femoral neck fracture superimposed on chronic changes  - ortho consult-- plans for surgical intervention tomorrow  - EKG with SR with incomplete R BBB  - CXR with cardiomegaly and chronic vascular congestion  - plans for surgical intervention tomorrow  - PT/OT/SS consulted for post op assistance  - heparin SQ x 1 dose tonight   - AC after surgery per surgery preference   - ALMEIDA score of 0.9% ALISA risk-- risk of MI or cardiac arrest intra op or up to 30 days post op  - RCRI score of 2 points; class III risk; 10.1% 30 day risk of death, MI, or cardiac arrest     Perioperative Risk Assessment:  Patient is at intermediate risk for perioperative cardiopulmonary complications.  This based on lack of thorough history, hx of CVA and CABG. However risk are stable and see no medical benefit in delaying surgery from medical standpoint.    Surgery 01/02    1/3  - stable post op  - hemovac in place  - PT/OT to see  - SS following for SWB assistance   1/4  - hemovac to be pulled today  - PT following  - baclofen added to spasticity   1/6  - PO day#4  - PT/OT  - SWB placement per SS  1/7  - po day#5  - dsg d/i  1/8  - po DAY#6  1/9  - po DAY#7  - Continue to attempt placement. PT continues in Hospital. SS consulted for WC and any medical devices needed.  1/10  - po DAY#8  - pt agreeable to snf for pt and rehab.SS following  1/11  - po day #8. Continues to work with PT  - pt agreeable to snf for pt and rehab.SS following  1/12  - pending placement  - continue current POC       Acute on chronic combined systolic and diastolic heart failure        1/6/22  · The left ventricle is severely enlarged with mild  eccentric hypertrophy and severely decreased systolic function.  · The estimated ejection fraction is 20%.  · There are segmental left ventricular wall motion abnormalities.  · Left ventricular diastolic dysfunction.  · Atrial fibrillation not observed.  · Normal right ventricular size.  · Mild mitral regurgitation.  · Normal central venous pressure (3 mmHg).  1/7   Lisinopril 2.5 mg  - Lopressor 12.5 mg bid  1/7  - Norvasc stopped  - Lisinopril 2.5 mg  - Lopressor 12.5 mg bid  - Cardiologist appt new pt at [facility].  1/8  - Lisinopril increased 5 mg        ETOH abuse  - chronic  - unclear amount pt does partake in daily   - drinks liquor instead of beer  - monitor for DTs   - banana bag daily- MVI, thiamine, and folic acid  - ativan PO scheduled to defer DTs   - ativan IV for breakthrough symptoms   1/4  - no s/s of withdrawals   1/5  - - no s/s of DT'S  1/12  - wean ativan scheduled           Hx of CABG  - chronic, stable  - continue current meds  - cardiac monitoring   - EKG with SR with incomplete RBBB      GERD (gastroesophageal reflux disease)  - chronic, stable  - continue current meds       Hypertension  - chronic, stable  - continue current home meds  - monitor BP trend  - adjust meds as needed   1/4  - stable   1/7  - Norvasc stopped  - Lisinopril 2.5 mg  - Lopressor 12.5 mg bid  1/9  - BP stable  - Lisinopril increased to 5 mg daily  1/12  - stable     Right sided weakness  - chronic, stable  - hx of CVA with R sided hemiparesis   -patient is right-handed  - continue statin   1/4  - PT following  - plans for SWB   1/8  - Pt has mobility limitation that significantly impairs his MRADL'S including his ability for toileting and bathing at home.Limitations cannot be resolved with use of a cane or walker due to increased risk of falling. A wheelchair will improve the pt's ability to perform MRADL'S. Pt is willing to and able to propel in wheelchair.  1/12  - stable       VTE Risk Mitigation (From admission, onward)          Ordered     apixaban tablet 2.5 mg  2 times daily         01/02/22 1916     IP VTE HIGH RISK PATIENT  Once         01/02/22 1916     Place SAGE hose  Until discontinued         01/02/22 1916     Place sequential compression device  Until discontinued         01/02/22 1916     Place sequential compression device  Until discontinued         01/01/22 1707                Discharge Planning   DEANNA:      Code Status: Full Code   Is the patient medically ready for discharge?:     Reason for patient still in hospital (select all that apply): Treatment  Discharge Plan A: Rehab ([facility])          [facility] Link  Department of Hospital Medicine   [facility] - Orthopedic   - - -

## 2023-03-03 NOTE — ED ADULT NURSE REASSESSMENT NOTE - ANCILLARY STATUS
Patient service kindly taken over by [facility] team. Will sign off. Did not see the patient. trop results, TTE read/lab results pending

## 2024-10-22 ENCOUNTER — OFFICE (OUTPATIENT)
Dept: URBAN - METROPOLITAN AREA CLINIC 114 | Facility: CLINIC | Age: 59
Setting detail: OPHTHALMOLOGY
End: 2024-10-22
Payer: COMMERCIAL

## 2024-10-22 DIAGNOSIS — E11.9: ICD-10-CM

## 2024-10-22 DIAGNOSIS — H35.033: ICD-10-CM

## 2024-10-22 DIAGNOSIS — H43.393: ICD-10-CM

## 2024-10-22 DIAGNOSIS — H16.222: ICD-10-CM

## 2024-10-22 DIAGNOSIS — H16.221: ICD-10-CM

## 2024-10-22 DIAGNOSIS — H16.223: ICD-10-CM

## 2024-10-22 PROBLEM — H35.40 PERIPAPILLARY ATROPHY: Status: ACTIVE | Noted: 2024-10-22

## 2024-10-22 PROCEDURE — 83861 MICROFLUID ANALY TEARS: CPT | Mod: LT | Performed by: OPHTHALMOLOGY

## 2024-10-22 PROCEDURE — 83861 MICROFLUID ANALY TEARS: CPT | Mod: RT | Performed by: OPHTHALMOLOGY

## 2024-10-22 PROCEDURE — 92250 FUNDUS PHOTOGRAPHY W/I&R: CPT | Performed by: OPHTHALMOLOGY

## 2024-10-22 PROCEDURE — 99204 OFFICE O/P NEW MOD 45 MIN: CPT | Performed by: OPHTHALMOLOGY

## 2024-10-22 ASSESSMENT — CONFRONTATIONAL VISUAL FIELD TEST (CVF)
OD_FINDINGS: FULL
OS_FINDINGS: FULL

## 2024-10-22 ASSESSMENT — REFRACTION_AUTOREFRACTION
OD_CYLINDER: -0.50
OD_SPHERE: 0.00
OS_AXIS: 094
OS_SPHERE: -0.25
OS_CYLINDER: -0.25
OD_AXIS: 101

## 2024-10-22 ASSESSMENT — KERATOMETRY
OD_K1POWER_DIOPTERS: 44.75
OD_AXISANGLE_DEGREES: 065
OD_K2POWER_DIOPTERS: 45.50
OS_K2POWER_DIOPTERS: 45.75
OS_K1POWER_DIOPTERS: 45.25
OS_AXISANGLE_DEGREES: 096

## 2024-10-22 ASSESSMENT — VISUAL ACUITY
OS_BCVA: 20/20
OD_BCVA: 20/20

## 2024-10-22 ASSESSMENT — TONOMETRY
OS_IOP_MMHG: 18
OD_IOP_MMHG: 18

## 2024-10-22 ASSESSMENT — TEAR BREAK UP TIME (TBUT)
OS_TBUT: 1+
OD_TBUT: 1+